# Patient Record
Sex: MALE | Race: BLACK OR AFRICAN AMERICAN
[De-identification: names, ages, dates, MRNs, and addresses within clinical notes are randomized per-mention and may not be internally consistent; named-entity substitution may affect disease eponyms.]

---

## 2019-06-28 ENCOUNTER — HOSPITAL ENCOUNTER (EMERGENCY)
Dept: HOSPITAL 76 - ED | Age: 24
Discharge: HOME | End: 2019-06-28
Payer: COMMERCIAL

## 2019-06-28 VITALS — SYSTOLIC BLOOD PRESSURE: 139 MMHG | DIASTOLIC BLOOD PRESSURE: 76 MMHG

## 2019-06-28 DIAGNOSIS — S29.012A: Primary | ICD-10-CM
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
## 2019-06-28 DIAGNOSIS — M25.511: ICD-10-CM
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
## 2019-06-28 PROCEDURE — 99283 EMERGENCY DEPT VISIT LOW MDM: CPT

## 2019-06-28 PROCEDURE — 99281 EMR DPT VST MAYX REQ PHY/QHP: CPT

## 2019-06-28 NOTE — ED PHYSICIAN DOCUMENTATION
PD HPI BACK INJURY





- Stated complaint


Stated Complaint: R SIDE NECK/SHOULDER PX





- History obtained from


History obtained from: Patient





- History of Present Illness


Location: Right (He was doing right-sided chest presses on a machine at noon and

he felt a pop and a pull from the right shoulder blade up into the right neck 

and has significant pain.  There is no shortness of breath or chest pain.)





Review of Systems


Constitutional: denies: Fever, Chills


Cardiac: denies: Chest pain / pressure, Palpitations


Respiratory: denies: Dyspnea, Cough


GI: denies: Abdominal Pain





PD PAST MEDICAL HISTORY





- Present Medications


Home Medications: 


                                Ambulatory Orders











 Medication  Instructions  Recorded  Confirmed


 


Cyclobenzaprine [Flexeril] 10 mg PO TID PRN #14 tablet 06/28/19 


 


Ibuprofen [Motrin] 800 mg PO Q8H PRN #30 tablet 06/28/19 














- Allergies


Allergies/Adverse Reactions: 


                                    Allergies











Allergy/AdvReac Type Severity Reaction Status Date / Time


 


No Known Drug Allergies Allergy   Verified 06/28/19 12:44














PD ED PE NORMAL





- Vitals


Vital signs reviewed: Yes





- General


General: Alert and oriented X 3, No acute distress





- Neck


Neck: Supple, no meningeal sign, No bony TTP





- Cardiac


Cardiac: RRR, No murmur





- Respiratory


Respiratory: No respiratory distress, Clear bilaterally





- Abdomen


Abdomen: Non tender





- Back


Back: Other (Tender of the right rhomboid and less so the right 

sternocleidomastoid without limited range of motion of the right shoulder.  Some

 difficulty with rotation of the neck but flexion extension are relatively 

spared.)





- Neuro


Neuro: Alert and oriented X 3, CNs 2-12 intact, No motor deficit, No sensory 

deficit, Normal speech





Results





- Vitals


Vitals: 





                               Vital Signs - 24 hr











  06/28/19





  12:44


 


Temperature 36.6 C


 


Heart Rate 57 L


 


Respiratory 16





Rate 


 


Blood Pressure 139/76 H


 


O2 Saturation 98








                                     Oxygen











O2 Source                      Room air

















Departure





- Departure


Disposition: 01 Home, Self Care


Clinical Impression: 


Rhomboid muscle strain


Qualifiers:


 Encounter type: initial encounter Qualified Code(s): S29.012A - Strain of 

muscle and tendon of back wall of thorax, initial encounter





Condition: Good


Record reviewed to determine appropriate education?: Yes


Health Concerns: 


Back pain during weight lifting


Plan of Treatment: 


Anti-inflammatories and muscle relaxers as well as relative rest and heat for a 

few days


Care Goals: 


Improvement of function and pain


Assessment: 


As above


Instructions:  ED Sprain Strain Neck


Prescriptions: 


Cyclobenzaprine [Flexeril] 10 mg PO TID PRN #14 tablet


 PRN Reason: Spasms


Ibuprofen [Motrin] 800 mg PO Q8H PRN #30 tablet


 PRN Reason: PAIN &/OR FEVER


Comments: 


[place] not [place]


Forms:  Activity restrictions

## 2019-10-08 ENCOUNTER — HOSPITAL ENCOUNTER (EMERGENCY)
Dept: HOSPITAL 76 - ED | Age: 24
Discharge: HOME | End: 2019-10-08
Payer: COMMERCIAL

## 2019-10-08 VITALS — SYSTOLIC BLOOD PRESSURE: 132 MMHG | DIASTOLIC BLOOD PRESSURE: 69 MMHG

## 2019-10-08 DIAGNOSIS — M54.5: Primary | ICD-10-CM

## 2019-10-08 PROCEDURE — 99283 EMERGENCY DEPT VISIT LOW MDM: CPT

## 2019-10-08 PROCEDURE — 99282 EMERGENCY DEPT VISIT SF MDM: CPT

## 2019-10-08 NOTE — ED PHYSICIAN DOCUMENTATION
PD HPI BACK INJURY





- Stated complaint


Stated Complaint: LOWER BACK PX





- History obtained from


History obtained from: Patient





- History of Present Illness


Location: Left (Has had on-and-off problems with his back for several months 

now.  Left low lumbar spine.  Good days and bad days.  He was uncomfortable last

night and slept poorly because of it now has more significant pain today.  It is

not the worst that its been though.  He denies weakness, numbness, tingling, 

saddle anesthesia, incontinence, fevers.  It is worse with bending and 

twisting.)





Review of Systems


Constitutional: denies: Fever, Chills


Cardiac: reports: Reviewed and negative


Respiratory: reports: Reviewed and negative


GI: reports: Reviewed and negative





PD PAST MEDICAL HISTORY





- Present Medications


Home Medications: 


                                Ambulatory Orders











 Medication  Instructions  Recorded  Confirmed


 


Cyclobenzaprine [Flexeril] 10 mg PO TID PRN #20 tablet 10/08/19 


 


Ibuprofen [Motrin] 800 mg PO Q8H PRN #30 tablet 10/08/19 














- Allergies


Allergies/Adverse Reactions: 


                                    Allergies











Allergy/AdvReac Type Severity Reaction Status Date / Time


 


No Known Drug Allergies Allergy   Verified 06/28/19 12:44














- Social History


Does the pt smoke?: No


Smoking Status: Never smoker





- Immunizations


Immunizations are current?: Yes





PD ED PE NORMAL





- Vitals


Vital signs reviewed: Yes





- General


General: Alert and oriented X 3, No acute distress





- Back


Back: Other (There is no midline spinal tenderness, he does have some tenderness

 of the left paralumbar musculature, no skin changes.)





- Extremities


Extremities: Other (The patient has equal and normal Achilles and patellar 

reflexes bilaterally.  Normal sensation in all areas of the legs.  Patient 

denies saddle anesthesia.  Normal strength in flexion-extension at the ankles, 

knees, and flexion of the hips.)





- Neuro


Neuro: Alert and oriented X 3, Normal speech





Results





- Vitals


Vitals: 





                               Vital Signs - 24 hr











  10/08/19





  13:48


 


Temperature 36.9 C


 


Heart Rate 76


 


Respiratory 18





Rate 


 


Blood Pressure 132/68 H


 


O2 Saturation 98








                                     Oxygen











O2 Source                      Room air

















PD MEDICAL DECISION MAKING





- ED course


ED course: 





This patient has seemingly uncomplicated musculoskeletal back pain.  The patient

 has no "red flags."  Specifically denies IV drug use, fevers, incontinence, 

saddle anesthesia.  Spinal epidural abscess was considered, given that the 

patient has no fever, is not diabetic, has no spinal tenderness, does not use IV

 drugs, and has no bilateral neurologic symptoms, the diagnosis of spinal 

epidural abscess is considered exceedingly unlikely.





Departure





- Departure


Disposition: 01 Home, Self Care


Clinical Impression: 


Low back pain


Qualifiers:


 Chronicity: acute Back pain laterality: left Sciatica presence: without 

sciatica Qualified Code(s): M54.5 - Low back pain





Condition: Good


Record reviewed to determine appropriate education?: Yes


Instructions:  ED Low Back Pain Injury


Prescriptions: 


Cyclobenzaprine [Flexeril] 10 mg PO TID PRN #20 tablet


 PRN Reason: Spasms


Ibuprofen [Motrin] 800 mg PO Q8H PRN #30 tablet


 PRN Reason: PAIN &/OR FEVER


Comments: 


[place], to return 

for new or worsening symptoms.


Forms:  Activity restrictions